# Patient Record
(demographics unavailable — no encounter records)

---

## 2024-11-11 NOTE — HISTORY OF PRESENT ILLNESS
[FreeTextEntry1] : Type: 2 Diabetes since: 20 years Number of years on insulin: 5-6 years Diagnosed by: routine labs for life insurance Current severity: uncontrolled FH of diabetes: mom, dad, sister  INTERVAL HISTORY: Still with osteomyelitis to R great toe. Reports his podiatrist was reducing his callouses in May 2023 and went to deep, resulting in an open wound which then became infected. Has been dealing with infection since then, now seeing Dr Gordon. Still on antibiotics. Weekly visits with new podiatrist/wound center.  Current regimen for glycemic control: Metformin  mg, 4 tabs daily Ozempic 1 mg weekly--->was unable to tolerate 2 mg dose due to GI upset Jardiance 25 mg daily Lantus 24 units HS  Current B, fasting SMBG with CASTTstyle Jerrica. Reviewed CGMS.  Avg  CV 24.0% Very high 0% High 12% Target 87% Low 1% Very low %  Interpretation: overall good glycemic control, post prandial hyperglycemia some evenings due to diet nonadherence.  Diet: less bread, less pasta, no juice/soda. Works as [employer] Exercise: none, works too much Weight: stable  Eye doctor: 2024 with minimal NPDR OU, no treatment indicated. Neuropathy: yes, seeing podiatrist (Dr Gordon) weekly now d/t osteomyelitis 2023, still on antibiotics. CKD: proteinuria, sees nephrology q 6 months. Saw them last week, all stable per patient. other (PVD/MI/CVA): PVD, CAD s/p 3 TODD and then 2V CABG 2022. Has CHF EF 30-35%, no CVA. Carotid endarterectomy done 2024. Non smoker

## 2024-11-11 NOTE — ASSESSMENT
[FreeTextEntry1] : 68 year old male with T2DM and associated NPDR, proteinuria, CAD s/p 3 TODD and 2V CABG, and neuropathy, currently on antibiotics for osteomyelitis to R great toe.  1. T2DM- CGMS with avg , 87% of values in target range and 1% low. Post prandial hyperglycemia some evenings due to dietary excursions. -Continue Lantus, Ozempic, and Jardiance. -Continue Metformin  mg, 4 tabs daily. EF borderline at 30-35%. If worsening, will d/c Metformin.  -Continue SMBG with Freestyle Jerrica. -Lifestyle modifications- diet, exercise, and weight loss- to improve glycemic control. -Check A1c now and again in 3 months prior to next office visit with Dr Guerra. -Continue follow-up with ophthalmology, podiatry/wound clinic, nephrology, and cardiology. All specialists currently up to date.  2. Hyperlipidemia -Continue statin and fenofibrate. -Repeat lipids now.  3. Hypertension- controlled. -Continue current regimen; managed by cardiologist.

## 2025-03-21 NOTE — HISTORY OF PRESENT ILLNESS
[FreeTextEntry1] : here for T2DM had syncope and chest pain found to have MVD s/p 2v CABG 12/6/22 had R foot osteo 12/2023  type: 2  diabetes since: 20 years   number of years on insulin: 5-6 years  diagnosed by: routine labs for life insurance current severity: uncontrolled FH of diabetes: mom, dad, sister  interval history chart reviewed  no recent labs  had company from [employer] over and has been having more pasta GSH s/p L CEA 2/2025 then had TIA. had symptoms of lost of taster right side/right side weakness with tingling but resolved within 1 day  regimen lantus 26units ozempic 1mg weekly Monday (2mg causes GI upset) metformin ER 500mg 4 tabs jardiance 25mg daily   FS in office: 160 Jerrica 3 reviewed avg 170 highs 36% in target 64% lows 0%   diet: less bread, less pasta, no juice/soda. works as  exercise: none. works too much  eye doctor: mild NPDR OU. saw 2024 neuropathy: yes, sees foot doctor, R foot osteo CKD: denies other (PVD/MI/CVA): PVD, CAD s/p 3 TODD, has CHF EF 30-35%, 12/6/22 s/p 2v cabg, no CVA non smoker

## 2025-03-21 NOTE — PHYSICAL EXAM
[Alert] : alert [Well Nourished] : well nourished [No Acute Distress] : no acute distress [Well Developed] : well developed [Normal Sclera/Conjunctiva] : normal sclera/conjunctiva [EOMI] : extra ocular movement intact [No Proptosis] : no proptosis [Normal Oropharynx] : the oropharynx was normal [Thyroid Not Enlarged] : the thyroid was not enlarged [No Thyroid Nodules] : no palpable thyroid nodules [No Respiratory Distress] : no respiratory distress [No Accessory Muscle Use] : no accessory muscle use [Clear to Auscultation] : lungs were clear to auscultation bilaterally [Normal S1, S2] : normal S1 and S2 [Normal Rate] : heart rate was normal [Regular Rhythm] : with a regular rhythm [Normal Bowel Sounds] : normal bowel sounds [Not Tender] : non-tender [Not Distended] : not distended [Soft] : abdomen soft [No Stigmata of Cushings Syndrome] : no stigmata of Cushings Syndrome [Normal Gait] : normal gait [Normal Strength/Tone] : muscle strength and tone were normal [No Rash] : no rash [Normal Reflexes] : deep tendon reflexes were 2+ and symmetric [No Tremors] : no tremors [Oriented x3] : oriented to person, place, and time [Normal Affect] : the affect was normal [Normal Mood] : the mood was normal [Acanthosis Nigricans] : no acanthosis nigricans [Delayed in the Right Toes] : normal in the toes [Delayed in the Left Toes] : normal in the toes [de-identified] : RLE edema (site of CABG harvest)

## 2025-03-21 NOTE — ASSESSMENT
[Carbohydrate Consistent Diet] : carbohydrate consistent diet [Long Term Vascular Complications] : long term vascular complications of diabetes [Importance of Diet and Exercise] : importance of diet and exercise to improve glycemic control, achieve weight loss and improve cardiovascular health [Action and use of Insulin] : action and use of short and long-acting insulin [Self Monitoring of Blood Glucose] : self monitoring of blood glucose [FreeTextEntry1] : 69M, [place] , w/ T2DM cb CAD s/p 3 stents/CHF/2vCABG/PVD/TIA, HLD and HTN here for follow up rtc with np in 3-4months with labs rtc 8 months with me labs in office today sees cardiology, Dr. Angel Doan  T2DM with complications - overall okay. recent postprandial highs due to family visiting, goal A1c<7 based on age continue Libre3 Up to date with foot doctor (sees Dr. Gordon now) needs to see eye doctor, book for Dr. Blackburn today continue metformin ER 500mg 4 tabs daily continue jardiance 25mg daily continue ozempic 1mg weekly (GI upset with 2mg) continue lantus 26 units daily EF slightly decreased and borderline at 30-35% but will leave metformin for now, if worsening EF, then will dc (stress test next month) cannot get actos due to CHF  HLD- check labs. continue fenofibrate 145mg daily and statin  HTN- BP acceptable. continue BB/HCTZ/spironolactone. managed by cardiologist, will not add arb/acei   ------ This patient with diabetes - Injects insulin 1+ times daily - Is currently on CGM, testing glucose continuously - Has been seen in the office by a provider within the last six months to review CGM data with their provider CGM is medically necessary for this pt. - CGM will improve/maintain A1c - CGM will reduce hypoglycemic events Jerrica and Dexcom each require one test strip daily to use in case of sensor failure or for blood glucose verification or during sensor warmup.